# Patient Record
Sex: FEMALE | Race: OTHER | Employment: UNEMPLOYED | URBAN - METROPOLITAN AREA
[De-identification: names, ages, dates, MRNs, and addresses within clinical notes are randomized per-mention and may not be internally consistent; named-entity substitution may affect disease eponyms.]

---

## 2018-06-19 ENCOUNTER — ANESTHESIA EVENT (OUTPATIENT)
Dept: SURGERY | Age: 48
End: 2018-06-19
Payer: SELF-PAY

## 2018-06-19 NOTE — H&P
Outpatient Surgery History and Physical      Lala Ahmadi was seen and examined. Chief Complaint:   LEFT WRIST PAIN     Physical Exam:   There were no vitals taken for this visit. Heart:   Regular rhythm      Lungs:  Are clear      History:  No past medical history on file. No past surgical history on file. No family history on file. Social History     Occupational History    Not on file. Social History Main Topics    Smoking status: Not on file    Smokeless tobacco: Not on file    Alcohol use Not on file    Drug use: Not on file    Sexual activity: Not on file       Allergies: Reviewed per EMR  Allergies not on file    Medications:    Prior to Admission medications    Not on File        The surgery is planned for OPEN REDUCTION INTERNAL FIXATION OF LEFT DISTAL RADIUS          The patient is here today for outpatient surgery. I have examined the patient, no changes are noted in the patient's medical status. Necessity for the procedure/care is still present and the history and physical above is current.       Signed By: Patt Vaughan NP     June 19, 2018 3:30 PM

## 2018-06-20 ENCOUNTER — APPOINTMENT (OUTPATIENT)
Dept: GENERAL RADIOLOGY | Age: 48
End: 2018-06-20
Attending: ORTHOPAEDIC SURGERY
Payer: SELF-PAY

## 2018-06-20 ENCOUNTER — HOSPITAL ENCOUNTER (OUTPATIENT)
Age: 48
Setting detail: OUTPATIENT SURGERY
Discharge: HOME | End: 2018-06-20
Attending: ORTHOPAEDIC SURGERY | Admitting: ORTHOPAEDIC SURGERY
Payer: SELF-PAY

## 2018-06-20 ENCOUNTER — ANESTHESIA (OUTPATIENT)
Dept: SURGERY | Age: 48
End: 2018-06-20
Payer: SELF-PAY

## 2018-06-20 VITALS
HEART RATE: 67 BPM | WEIGHT: 179 LBS | DIASTOLIC BLOOD PRESSURE: 69 MMHG | SYSTOLIC BLOOD PRESSURE: 148 MMHG | RESPIRATION RATE: 16 BRPM | OXYGEN SATURATION: 95 % | TEMPERATURE: 98.5 F

## 2018-06-20 DIAGNOSIS — S62.102A CLOSED FRACTURE OF LEFT WRIST, INITIAL ENCOUNTER: Primary | ICD-10-CM

## 2018-06-20 LAB
HCG UR QL: NEGATIVE
HCG UR QL: NEGATIVE

## 2018-06-20 PROCEDURE — 74011250636 HC RX REV CODE- 250/636: Performed by: ANESTHESIOLOGY

## 2018-06-20 PROCEDURE — 77030000031 HC BIT DRL QC SYNT -C: Performed by: ORTHOPAEDIC SURGERY

## 2018-06-20 PROCEDURE — C1713 ANCHOR/SCREW BN/BN,TIS/BN: HCPCS | Performed by: ORTHOPAEDIC SURGERY

## 2018-06-20 PROCEDURE — 76210000063 HC OR PH I REC FIRST 0.5 HR: Performed by: ORTHOPAEDIC SURGERY

## 2018-06-20 PROCEDURE — 77030002933 HC SUT MCRYL J&J -A: Performed by: ORTHOPAEDIC SURGERY

## 2018-06-20 PROCEDURE — 77030018836 HC SOL IRR NACL ICUM -A: Performed by: ORTHOPAEDIC SURGERY

## 2018-06-20 PROCEDURE — 74011250637 HC RX REV CODE- 250/637: Performed by: ANESTHESIOLOGY

## 2018-06-20 PROCEDURE — 77030011884 HC TAPE CST PLSTR BSNM -A: Performed by: ORTHOPAEDIC SURGERY

## 2018-06-20 PROCEDURE — 77030011640 HC PAD GRND REM COVD -A: Performed by: ORTHOPAEDIC SURGERY

## 2018-06-20 PROCEDURE — 77030002916 HC SUT ETHLN J&J -A: Performed by: ORTHOPAEDIC SURGERY

## 2018-06-20 PROCEDURE — 74011000250 HC RX REV CODE- 250

## 2018-06-20 PROCEDURE — 76010010054 HC POST OP PAIN BLOCK: Performed by: ORTHOPAEDIC SURGERY

## 2018-06-20 PROCEDURE — 77030020782 HC GWN BAIR PAWS FLX 3M -B: Performed by: NURSE ANESTHETIST, CERTIFIED REGISTERED

## 2018-06-20 PROCEDURE — 74011250636 HC RX REV CODE- 250/636

## 2018-06-20 PROCEDURE — 77030003602 HC NDL NRV BLK BBMI -B: Performed by: NURSE ANESTHETIST, CERTIFIED REGISTERED

## 2018-06-20 PROCEDURE — 74011250636 HC RX REV CODE- 250/636: Performed by: NURSE PRACTITIONER

## 2018-06-20 PROCEDURE — 81025 URINE PREGNANCY TEST: CPT

## 2018-06-20 PROCEDURE — 76010000161 HC OR TIME 1 TO 1.5 HR INTENSV-TIER 1: Performed by: ORTHOPAEDIC SURGERY

## 2018-06-20 PROCEDURE — 76060000033 HC ANESTHESIA 1 TO 1.5 HR: Performed by: ORTHOPAEDIC SURGERY

## 2018-06-20 PROCEDURE — 76210000020 HC REC RM PH II FIRST 0.5 HR: Performed by: ORTHOPAEDIC SURGERY

## 2018-06-20 PROCEDURE — 77030016570 HC BLNKT BAIR HGGR 3M -B: Performed by: NURSE ANESTHETIST, CERTIFIED REGISTERED

## 2018-06-20 PROCEDURE — 76942 ECHO GUIDE FOR BIOPSY: CPT | Performed by: ORTHOPAEDIC SURGERY

## 2018-06-20 PROCEDURE — 77030000032 HC CUF TRNQT ZIMM -B: Performed by: ORTHOPAEDIC SURGERY

## 2018-06-20 PROCEDURE — 73100 X-RAY EXAM OF WRIST: CPT

## 2018-06-20 DEVICE — PLATE BNE LCK 2.4X54 MM LT VOLAR CLMN DSTL 10 HOLE COMPR SS: Type: IMPLANTABLE DEVICE | Site: RADIUS | Status: FUNCTIONAL

## 2018-06-20 DEVICE — SCREW BNE L12MM DIA2.4MM S STL ST LOK FULL THRD T8 STARDRV: Type: IMPLANTABLE DEVICE | Site: RADIUS | Status: FUNCTIONAL

## 2018-06-20 DEVICE — SCREW BNE L20MM DIA2.4MM S STL ST LOK FULL THRD T8 STARDRV: Type: IMPLANTABLE DEVICE | Site: RADIUS | Status: FUNCTIONAL

## 2018-06-20 DEVICE — SCREW BNE L16MM DIA2.4MM S STL ST LOK FULL THRD T8 STARDRV: Type: IMPLANTABLE DEVICE | Site: RADIUS | Status: FUNCTIONAL

## 2018-06-20 RX ORDER — ONDANSETRON 2 MG/ML
4 INJECTION INTRAMUSCULAR; INTRAVENOUS
Status: DISCONTINUED | OUTPATIENT
Start: 2018-06-20 | End: 2018-06-20 | Stop reason: HOSPADM

## 2018-06-20 RX ORDER — LIDOCAINE HYDROCHLORIDE 10 MG/ML
0.1 INJECTION INFILTRATION; PERINEURAL AS NEEDED
Status: DISCONTINUED | OUTPATIENT
Start: 2018-06-20 | End: 2018-06-20 | Stop reason: HOSPADM

## 2018-06-20 RX ORDER — SODIUM CHLORIDE, SODIUM LACTATE, POTASSIUM CHLORIDE, CALCIUM CHLORIDE 600; 310; 30; 20 MG/100ML; MG/100ML; MG/100ML; MG/100ML
75 INJECTION, SOLUTION INTRAVENOUS
Status: COMPLETED | OUTPATIENT
Start: 2018-06-20 | End: 2018-06-20

## 2018-06-20 RX ORDER — SODIUM CHLORIDE 0.9 % (FLUSH) 0.9 %
5-10 SYRINGE (ML) INJECTION AS NEEDED
Status: DISCONTINUED | OUTPATIENT
Start: 2018-06-20 | End: 2018-06-20 | Stop reason: HOSPADM

## 2018-06-20 RX ORDER — ALBUTEROL SULFATE 0.83 MG/ML
2.5 SOLUTION RESPIRATORY (INHALATION) AS NEEDED
Status: DISCONTINUED | OUTPATIENT
Start: 2018-06-20 | End: 2018-06-20 | Stop reason: HOSPADM

## 2018-06-20 RX ORDER — ACETAMINOPHEN 500 MG
1000 TABLET ORAL ONCE
Status: COMPLETED | OUTPATIENT
Start: 2018-06-20 | End: 2018-06-20

## 2018-06-20 RX ORDER — LIDOCAINE HYDROCHLORIDE 20 MG/ML
INJECTION, SOLUTION EPIDURAL; INFILTRATION; INTRACAUDAL; PERINEURAL AS NEEDED
Status: DISCONTINUED | OUTPATIENT
Start: 2018-06-20 | End: 2018-06-20 | Stop reason: HOSPADM

## 2018-06-20 RX ORDER — PROPOFOL 10 MG/ML
INJECTION, EMULSION INTRAVENOUS AS NEEDED
Status: DISCONTINUED | OUTPATIENT
Start: 2018-06-20 | End: 2018-06-20 | Stop reason: HOSPADM

## 2018-06-20 RX ORDER — SODIUM CHLORIDE, SODIUM LACTATE, POTASSIUM CHLORIDE, CALCIUM CHLORIDE 600; 310; 30; 20 MG/100ML; MG/100ML; MG/100ML; MG/100ML
1000 INJECTION, SOLUTION INTRAVENOUS CONTINUOUS
Status: DISCONTINUED | OUTPATIENT
Start: 2018-06-20 | End: 2018-06-20 | Stop reason: HOSPADM

## 2018-06-20 RX ORDER — CEFAZOLIN SODIUM/WATER 2 G/20 ML
2 SYRINGE (ML) INTRAVENOUS
Status: COMPLETED | OUTPATIENT
Start: 2018-06-20 | End: 2018-06-20

## 2018-06-20 RX ORDER — ROPIVACAINE HYDROCHLORIDE 5 MG/ML
INJECTION, SOLUTION EPIDURAL; INFILTRATION; PERINEURAL AS NEEDED
Status: DISCONTINUED | OUTPATIENT
Start: 2018-06-20 | End: 2018-06-20 | Stop reason: HOSPADM

## 2018-06-20 RX ORDER — HYDROMORPHONE HYDROCHLORIDE 2 MG/ML
0.5 INJECTION, SOLUTION INTRAMUSCULAR; INTRAVENOUS; SUBCUTANEOUS
Status: DISCONTINUED | OUTPATIENT
Start: 2018-06-20 | End: 2018-06-20 | Stop reason: HOSPADM

## 2018-06-20 RX ORDER — SODIUM CHLORIDE 0.9 % (FLUSH) 0.9 %
5-10 SYRINGE (ML) INJECTION EVERY 8 HOURS
Status: DISCONTINUED | OUTPATIENT
Start: 2018-06-20 | End: 2018-06-20 | Stop reason: HOSPADM

## 2018-06-20 RX ORDER — PROPOFOL 10 MG/ML
INJECTION, EMULSION INTRAVENOUS
Status: DISCONTINUED | OUTPATIENT
Start: 2018-06-20 | End: 2018-06-20 | Stop reason: HOSPADM

## 2018-06-20 RX ORDER — MIDAZOLAM HYDROCHLORIDE 1 MG/ML
2 INJECTION, SOLUTION INTRAMUSCULAR; INTRAVENOUS
Status: COMPLETED | OUTPATIENT
Start: 2018-06-20 | End: 2018-06-20

## 2018-06-20 RX ADMIN — PROPOFOL 50 MG: 10 INJECTION, EMULSION INTRAVENOUS at 13:08

## 2018-06-20 RX ADMIN — SODIUM CHLORIDE, SODIUM LACTATE, POTASSIUM CHLORIDE, AND CALCIUM CHLORIDE: 600; 310; 30; 20 INJECTION, SOLUTION INTRAVENOUS at 12:59

## 2018-06-20 RX ADMIN — ACETAMINOPHEN 1000 MG: 500 TABLET, FILM COATED ORAL at 10:37

## 2018-06-20 RX ADMIN — LIDOCAINE HYDROCHLORIDE 40 MG: 20 INJECTION, SOLUTION EPIDURAL; INFILTRATION; INTRACAUDAL; PERINEURAL at 13:08

## 2018-06-20 RX ADMIN — Medication 2 G: at 13:08

## 2018-06-20 RX ADMIN — ROPIVACAINE HYDROCHLORIDE 30 ML: 5 INJECTION, SOLUTION EPIDURAL; INFILTRATION; PERINEURAL at 12:07

## 2018-06-20 RX ADMIN — MIDAZOLAM HYDROCHLORIDE 2 MG: 1 INJECTION, SOLUTION INTRAMUSCULAR; INTRAVENOUS at 12:04

## 2018-06-20 RX ADMIN — SODIUM CHLORIDE, SODIUM LACTATE, POTASSIUM CHLORIDE, AND CALCIUM CHLORIDE 75 ML/HR: 600; 310; 30; 20 INJECTION, SOLUTION INTRAVENOUS at 10:38

## 2018-06-20 RX ADMIN — PROPOFOL 100 MCG/KG/MIN: 10 INJECTION, EMULSION INTRAVENOUS at 13:08

## 2018-06-20 NOTE — PROGRESS NOTES
present at bedside during anesthesia peripheral block with Dr. Karli Villalobos and unit nurses. Ghassan German Minh  [email or web address]  c: [phone] / Antelmo Story [address] / [place], [facility]  [email or web address]. [facility]

## 2018-06-20 NOTE — PROGRESS NOTES
present at bedside in pre-op during assessment with unit nurse and signature of consents. Vanessa Gooden Xin Bowen  Patient [email or web address]  c: [phone] / Antelmo Alvarado / [facility], [address] [place] [address]  [email or web address]. [email or web address]

## 2018-06-20 NOTE — ANESTHESIA PREPROCEDURE EVALUATION
Anesthetic History   No history of anesthetic complications            Review of Systems / Medical History  Patient summary reviewed and pertinent labs reviewed    Pulmonary  Within defined limits                 Neuro/Psych   Within defined limits           Cardiovascular                Pertinent negatives: No angina  Exercise tolerance: >4 METS     GI/Hepatic/Renal  Within defined limits              Endo/Other        Obesity     Other Findings   Comments: Interview through [address]  . Physical Exam    Airway  Mallampati: II  TM Distance: 4 - 6 cm  Neck ROM: normal range of motion   Mouth opening: Normal     Cardiovascular    Rhythm: regular           Dental         Pulmonary                 Abdominal         Other Findings            Anesthetic Plan    ASA: 2  Anesthesia type: total IV anesthesia      Post-op pain plan if not by surgeon: peripheral nerve block single    Induction: Intravenous  Anesthetic plan and risks discussed with: Patient      supraclav.

## 2018-06-20 NOTE — ANESTHESIA POSTPROCEDURE EVALUATION
Post-Anesthesia Evaluation and Assessment    Patient: Antione Mendez MRN: [id number]  SSN: [id number]    YOB: 1970  Age: 57 y.o. Sex: female       Cardiovascular Function/Vital Signs  Visit Vitals    /69    Pulse 70    Temp 36.9 °C (98.5 °F)    Resp 16    Wt 81.2 kg (179 lb)    SpO2 95%       Patient is status post total IV anesthesia anesthesia for Procedure(s):  LEFT 3 PARTm DISTAL RADIUS OPEN REDUCTION INTERNAL FIXATION/ CHOICE . Nausea/Vomiting: None    Postoperative hydration reviewed and adequate. Pain:  Pain Scale 1: Numeric (0 - 10) (06/20/18 1412)  Pain Intensity 1: 0 (06/20/18 1412)   Managed    Neurological Status:   Neuro (WDL): Exceptions to WDL (06/20/18 1412)  Neuro  Neurologic State: Drowsy;Sleeping (06/20/18 1412)   At baseline    Mental Status and Level of Consciousness: Arousable    Pulmonary Status:   O2 Device: Room air (06/20/18 1424)   Adequate oxygenation and airway patent    Complications related to anesthesia: None    Post-anesthesia assessment completed.  No concerns    Signed By: Darion Orozco MD     June 20, 2018

## 2018-06-20 NOTE — BRIEF OP NOTE
BRIEF OPERATIVE NOTE    Date of Procedure: 6/20/2018   Preoperative Diagnosis: Closed die punch fracture of distal end of left radius, initial encounter [N68.956A]  Postoperative Diagnosis: Left 3 part distal radius fracture    Procedure(s):  LEFT 3 PART DISTAL RADIUS OPEN REDUCTION INTERNAL FIXATION  Surgeon(s) and Role:     * Daniel Horton MD - Primary         Surgical Assistant: NONE    Surgical Staff:  Circ-1: Laura Juan RN  Circ-Relief: Tavia Payton RN  Scrub Tech-1: Jackie Mars  Scrub Tech-2: Brian Blevins  Scrub Tech-3: Mahnaz Tam  Event Time In   Incision Start 1321   Incision Close 1410     Anesthesia: Regional   Estimated Blood Loss: TOURNIQUET  Specimens: * No specimens in log *   Findings: NONE   Complications: NONE  Implants: * No implants in log *

## 2018-06-20 NOTE — ANESTHESIA PROCEDURE NOTES
Peripheral Block    Start time: 6/20/2018 12:00 PM  End time: 6/20/2018 12:07 PM  Performed by: Karla Fernández  Authorized by: Karla Fernández       Pre-procedure:    Indications: at surgeon's request and post-op pain management    Preanesthetic Checklist: patient identified, risks and benefits discussed, site marked, timeout performed, anesthesia consent given and patient being monitored    Timeout Time: 12:00          Block Type:   Block Type:  Supraclavicular  Laterality:  Left  Monitoring:  Continuous pulse ox, frequent vital sign checks, heart rate, oxygen and responsive to questions  Injection Technique:  Single shot  Procedures: ultrasound guided    Patient Position: supine (head elevated 45 degrees)  Prep: chlorhexidine    Location:  Supraclavicular  Needle Type:  Stimuplex  Needle Gauge:  20 G  Needle Localization:  Ultrasound guidance  Medication Injected:  0.5%  ropivacaine  Volume (mL):  30    Assessment:  Number of attempts:  1  Injection Assessment:  Incremental injection every 5 mL, local visualized surrounding nerve on ultrasound, negative aspiration for blood, no intravascular symptoms, no paresthesia and ultrasound image on chart  Patient tolerance:  Patient tolerated the procedure well with no immediate complications

## 2018-06-20 NOTE — IP AVS SNAPSHOT
[address] [place] [id number] 
[phone] Patient: Uyen Brewster MRN: [id number] PNY:7[birth year]02[birth year] About your hospitalization You were admitted on:  June 20, 2018 You last received care in the:  [facility] PACU You were discharged on:  June 20, 2018 Why you were hospitalized Your primary diagnosis was:  Not on File Follow-up Information Follow up With Details Comments Contact Info Ruthann Kim MD   [facility] 10 200 [address] 16 Discharge Orders Procedure Order Date Status Priority Quantity Spec Type Associated Dx CALL YOUR DOCTOR For: Severe uncontrolled pain. , Redness, tenderness, or signs of infection. 06/20/18 1208 Normal Routine 1  Closed fracture of left wrist, initial encounter [[id number]] Questions: For:  Severe uncontrolled pain. For:  Redness, tenderness, or signs of infection. ACTIVITY AFTER DISCHARGE Patient should: Restrict weight bearing, Restrict lifting 06/20/18 1208 Normal Routine 1  Closed fracture of left wrist, initial encounter [[id number]] Questions: Patient should:  Restrict weight bearing Patient should:  Restrict lifting DIET REGULAR No added salt 06/20/18 1208 Normal Routine 1  Closed fracture of left wrist, initial encounter [[id number]] Questions: Additional options:  No added salt DRESSING, DO NOT REMOVE 06/20/18 1208 Normal Routine 1  Closed fracture of left wrist, initial encounter [[id number]] Comments:  Keep clean, dry and intact until next clinic visit. A check dieter indicates which time of day the medication should be taken. My Medications CONTINUE taking these medications Instructions Each Dose to Equal  
 Morning Noon Evening Bedtime OTHER Your last dose was: Your next dose is:    
   
   
 nightly. Birth control pills STOP taking these medications   
 oxyCODONE-acetaminophen 5-325 mg per tablet Commonly known as:  PERCOCET Discharge Instructions FOLLOW-UP APPT WITH DR Triny Campos - 6/28/18 @ 10:00 AM 
 
NON-WEIGHT BEARING LEFT ARM 
NO LIFTING WITH LEFT ARM 
ELEVATE LEFT ARM 
LEFT ARM SLING 
MOVE FINGERS FREELY 
KEEP DRESSING CLEAN AND DRY 
 
PRESCRIPTION FOR OXYCODONE 5 MG GIVEN TO PATIENT AT OFFICE YESTERDAY After general anesthesia or intravenous sedation, for 24 hours or while taking prescription Narcotics: · Limit your activities · Do not drive and operate hazardous machinery · Do not make important personal or business decisions · Do  not drink alcoholic beverages · If you have not urinated within 8 hours after discharge, please contact your surgeon on call. *  Please give a list of your current medications to your Primary Care Provider. *  Please update this list whenever your medications are discontinued, doses are 
    changed, or new medications (including over-the-counter products) are added. *  Please carry medication information at all times in case of emergency situations. These are general instructions for a healthy lifestyle: No smoking/ No tobacco products/ Avoid exposure to second hand smoke Surgeon General's Warning:  Quitting smoking now greatly reduces serious risk to your health. Obesity, smoking, and sedentary lifestyle greatly increases your risk for illness A healthy diet, regular physical exercise & weight monitoring are important for maintaining a healthy lifestyle You may be retaining fluid if you have a history of heart failure or if you experience any of the following symptoms:  Weight gain of 3 pounds or more overnight or 5 pounds in a week, increased swelling in our hands or feet or shortness of breath while lying flat in bed.   Please call your doctor as soon as you notice any of these symptoms; do not wait until your next office visit. Recognize signs and symptoms of STROKE: 
F-face looks uneven A-arms unable to move or move unevenly S-speech slurred or non-existent T-time-call 911 as soon as signs and symptoms begin-DO NOT go Back to bed or wait to see if you get better-TIME IS BRAIN. Introducing [facility] & HEALTH SERVICES! [facility] introduce portal paciente MyChart . Ahora se puede acceder a partes de dubose expediente médico, enviar por correo electrónico la oficina de dubose médico y solicitar renovaciones de medicamentos en línea. En duboes navegador de Internet , Dyan Choudhary a [email or web address] Stephane clic en el usuario por Kassandra Butter? Gale Fitzpatrick clic aquí en la sesión [facility]. Verá la página de registro [place]. Ingrese dubose código de Bank  Rosa dez y dio aparece a continuación. Usted no tendrá que [employer] código después de mary completado el proceso de registro . Si usted no se inscribe antes de la fecha de caducidad , debe solicitar un nuevo código. · MyChart Código de acceso : [id number] Expires: 9/17/2018  3:00 PM 
 
Ingresa los últimos cuatro dígitos de dubose Número de Seguro Social ( xxxx ) y fecha de nacimiento ( dd / mm / aaaa ) dio se indica y stephane clic en Enviar. Usted será llevado a la siguiente página de registro . Crear un ID MyChart . Esta será dubose ID de inicio de sesión de MyChart y no puede ser [place] , por lo que pensar en rock que es Radha Laura y fácil de recordar . Crear rock contraseña MyChart . Usted puede cambiar dubose contraseña en cualquier momento . Ingrese dubose Password Reset de preguntas y [place] . [place] se puede utilizar en un momento posterior si usted olvida dubose contraseña. Introduzca dubose dirección de correo electrónico . Mariangel Zhu recibirá rock notificación por correo electrónico cuando la nueva información está disponible en MyChart . Cheron Baljit tomlin en Registrarse.  Jerri Milling libertad y descargar porciones de dubose expediente médico. 
 Randy nabor en el enlace de descarga del menú Resumen para descargar rock copia portátil de dubose información médica . Si tiene [employer] , por favor visite la sección de preguntas frecuentes del sitio web MyChart . Recuerde, MyChart NO es que se utilizará para las necesidades urgentes. Para emergencias médicas , llame al 911 . Ahora disponible en dubose iPhone y Android ! Introducing Juan Rodriguez As a [facility] patient, I wanted to make you aware of our electronic visit tool called Juan Rodriguez. MoranPotential 24/CoinSeed allows you to connect within minutes with a medical provider 24 hours a day, seven days a week via a mobile device or tablet or logging into a secure website from your computer. You can access Juan Rodriguez from anywhere in the [place]. A virtual visit might be right for you when you have a simple condition and feel like you just dont want to get out of bed, or cant get away from work for an appointment, when your regular [facility] provider is not available (evenings, weekends or holidays), or when youre out of town and need minor care. Electronic visits cost only $49 and if the MoranDoNation/CoinSeed provider determines a prescription is needed to treat your condition, one can be electronically transmitted to a nearby pharmacy*. Please take a moment to enroll today if you have not already done so. The enrollment process is free and takes just a few minutes. To enroll, please download the [employer]/CoinSeed anthony to your tablet or phone, or visit [email or web address] to enroll on your computer. And, as an [address] [place] [address] patient with a [employer] account, the results of your visits will be scanned into your electronic medical record and your primary care provider will be able to view the scanned results.    
We urge you to continue to see your regular [facility] provider for your ongoing medical care. And while your primary care provider may not be the one available when you seek a Juan Christiecollin virtual visit, the peace of mind you get from getting a real diagnosis real time can be priceless. For more information on Juan Christieiraisfin, view our Frequently Asked Questions (FAQs) at [email or web address]. Sincerely, 
 
Jarrett Castaneda MD 
Chief Medical Officer [facility] *:  certain medications cannot be prescribed via Juan Christiecollin Unresulted tests-please follow up with your PCP on these results Procedure/Test Authorizing Provider [place] XR TECHNOLOGIST Suraj Albrecht MD  
 XR WRIST LT AP/LAT Svitlana Baker MD  
  
Providers Seen During Your Hospitalization Provider Specialty Primary office phone Svitlana Baker MD Orthopedic Surgery [phone] Your Primary Care Physician (PCP) Primary Care Physician Office Phone Office Fax UNKNOWN, PROVIDER ** None ** ** None ** You are allergic to the following No active allergies Recent Documentation Weight OB Status Smoking Status 81.2 kg Having regular periods Never Smoker Emergency Contacts Name Discharge Info Relation Home Work Mobile Joanen Odell [5] [phone] Patient Belongings The following personal items are in your possession at time of discharge: 
  Dental Appliances: None Please provide this summary of care documentation to your next provider. Signatures-by signing, you are acknowledging that this After Visit Summary has been reviewed with you and you have received a copy. Patient Signature:  ____________________________________________________________ Date:  ____________________________________________________________  
  
Eileen Kevin Provider Signature:  ____________________________________________________________ Date:  ____________________________________________________________  
  
  
   
  
[address] 
[address] [address] 
[phone] Patient: Silvina Kelly MRN: [id number] XCT:2[birth year]89 Sobre dubose hospitalización Le admitieron el:  June 20, 2018 Dubose tratamiento más reciente [facility] [place]:  [facility] PACU Le dieron de meera el:  June 20, 2018 Por qué le ingresaron Dubose diagnosis primaria es:  No está archivado/a Follow-up Information Follow up With Details Comments Contact Info Nancy Price MD   [facility] 10 200 [employer] [address] [facility] [address] Discharge Orders Procedure Order Date Status Priority Quantity Spec Type Associated Dx CALL YOUR DOCTOR For: Severe uncontrolled pain. , Redness, tenderness, or signs of infection. 06/20/18 1208 Normal Routine 1  Closed fracture of left wrist, initial encounter [[id number]] Questions: For:  Severe uncontrolled pain. For:  Redness, tenderness, or signs of infection. ACTIVITY AFTER DISCHARGE Patient should: Restrict weight bearing, Restrict lifting 06/20/18 1208 Normal Routine 1  Closed fracture of left wrist, initial encounter [[id number]] Questions: Patient should:  Restrict weight bearing Patient should:  Restrict lifting DIET REGULAR No added salt 06/20/18 1208 Normal Routine 1  Closed fracture of left wrist, initial encounter [[id number]] Questions: Additional options:  No added salt DRESSING, DO NOT REMOVE 06/20/18 1208 Normal Routine 1  Closed fracture of left wrist, initial encounter [[id number]] Comments:  Keep clean, dry and intact until next clinic visit. A check dieter indicates which time of day the medication should be taken. My Medications SIGA tomando estos medicamentos Instructions Each Dose to Equal  
 Morning Noon Evening Bedtime  OTHER  
   
 Your last dose was: Your next dose is:    
   
   
 nightly. Birth control pills DEJE de anna estos medicamentos   
 oxyCODONE-acetaminophen 5-325 mg per tablet También conocido dio:  PERCOCET Instrucciones a mae de meera FOLLOW-UP APPT WITH DR Stevenson Irving - 6/28/18 @ 10:00 AM 
 
NON-WEIGHT BEARING LEFT ARM 
NO LIFTING WITH LEFT ARM 
ELEVATE LEFT ARM 
LEFT ARM SLING 
MOVE FINGERS FREELY 
KEEP DRESSING CLEAN AND DRY 
 
PRESCRIPTION FOR OXYCODONE 5 MG GIVEN TO PATIENT AT OFFICE YESTERDAY After general anesthesia or intravenous sedation, for 24 hours or while taking prescription Narcotics: · Limit your activities · Do not drive and operate hazardous machinery · Do not make important personal or business decisions · Do  not drink alcoholic beverages · If you have not urinated within 8 hours after discharge, please contact your surgeon on call. *  Please give a list of your current medications to your Primary Care Provider. *  Please update this list whenever your medications are discontinued, doses are 
    changed, or new medications (including over-the-counter products) are added. *  Please carry medication information at all times in case of emergency situations. These are general instructions for a healthy lifestyle: No smoking/ No tobacco products/ Avoid exposure to second hand smoke Surgeon General's Warning:  Quitting smoking now greatly reduces serious risk to your health. Obesity, smoking, and sedentary lifestyle greatly increases your risk for illness A healthy diet, regular physical exercise & weight monitoring are important for maintaining a healthy lifestyle You may be retaining fluid if you have a history of heart failure or if you experience any of the following symptoms:  Weight gain of 3 pounds or more overnight or 5 pounds in a week, increased swelling in our hands or feet or shortness of breath while lying flat in bed. Please call your doctor as soon as you notice any of these symptoms; do not wait until your next office visit. Recognize signs and symptoms of STROKE: 
F-face looks uneven A-arms unable to move or move unevenly S-speech slurred or non-existent T-time-call 911 as soon as signs and symptoms begin-DO NOT go Back to bed or wait to see if you get better-TIME IS BRAIN. Introducing [facility] & HEALTH SERVICES! [facility] introduce portal paciente MyChart . Ahora se puede acceder a partes de dubose expediente médico, enviar por correo electrónico la oficina de dubose médico y solicitar renovaciones de medicamentos en línea. En dubose navegador de Internet , Sue Torres a [email or web address] Stephane clic en el usuario por Massimo Peacock? Lacinda Comes clic aquí en la sesión [facility]. Verá la página de registro [place]. Ingrese dubose código de Bank of Rosa dez y dio aparece a continuación. Usted no tendrá que [employer] código después de mary completado el proceso de registro . Si usted no se inscribe antes de la fecha de caducidad , debe solicitar un nuevo código. · MyChart Código de acceso : [id number] Expires: 9/17/2018  3:00 PM 
 
Ingresa los últimos cuatro dígitos de dubose Número de Seguro Social ( xxxx ) y fecha de nacimiento ( dd / mm / aaaa ) dio se indica y stephane clic en Enviar. ted será llevado a la siguiente página de registro . Crear un ID MyChart . Esta será dubose ID de inicio de sesión de MyChart y no puede ser [place] , por lo que pensar en rock que es Miriam Stevens y fácil de recordar . Crear rock contraseña MyChart . ted puede cambiar dubose contraseña en cualquier momento . Ingrese dubose Password Reset de preguntas y [place] . [place] se puede utilizar en un momento posterior si usted olvida dubose contraseña.  
Introduzca dubose dirección de correo electrónico . Nara Prime recibirá rock notificación por correo electrónico cuando la nueva información está disponible en MyChart . Radha tomlin en Registrarse. Lorren Houston libertad y descargar porciones de dubose expediente médico. 
Randy nabor en el enlace de descarga del menú Resumen para descargar rock copia portátil de dubose información médica . Si tiene [employer] , por favor visite la sección de preguntas frecuentes del sitio web MyChart . Recuerde, MyChart NO es que se utilizará para las necesidades urgentes. Para emergencias médicas , llame al 911 . Ahora disponible en dubose iPhone y Android ! Introducing Juan Rodriguez As a [facility] patient, I wanted to make you aware of our electronic visit tool called Juan Rodriguez. Intimate Bridge 2 Conception 24/7 allows you to connect within minutes with a medical provider 24 hours a day, seven days a week via a mobile device or tablet or logging into a secure website from your computer. You can access Juan Rodriguez from anywhere in the [place]. A virtual visit might be right for you when you have a simple condition and feel like you just dont want to get out of bed, or cant get away from work for an appointment, when your regular [facility] provider is not available (evenings, weekends or holidays), or when youre out of town and need minor care. Electronic visits cost only $49 and if the Intimate Bridge 2 Conception 24/7 provider determines a prescription is needed to treat your condition, one can be electronically transmitted to a nearby pharmacy*. Please take a moment to enroll today if you have not already done so. The enrollment process is free and takes just a few minutes. To enroll, please download the Intimate Bridge 2 Conception 24/Siftit anthony to your tablet or phone, or visit [email or web address] to enroll on your computer. And, as an [address] [place] [address] patient with a [employer] account, the results of your visits will be scanned into your electronic medical record and your primary care provider will be able to view the scanned results. We urge you to continue to see your regular [facility] provider for your ongoing medical care. And while your primary care provider may not be the one available when you seek a Juan Rodriguez virtual visit, the peace of mind you get from getting a real diagnosis real time can be priceless. For more information on Juan Rodriguez, view our Frequently Asked Questions (FAQs) at [email or web address]. Sincerely, 
 
Nadira Collazo MD 
Chief Medical Officer [facility] *:  certain medications cannot be prescribed via Juan Christieiraisleydi Unresulted tests-please follow up with your PCP on these results Procedimiento/Prueba Autorizada por [place] XR TECHNOLOGIST Tez Garrido MD  
 XR WRIST LT AP/LAT Omar Patel MD  
  
Providers Seen During Your Hospitalization Personal Médico Especialidad Teléfono principal de la ofvanessaa Omar Patel MD Orthopedic Surgery [phone] [place] médico de atención primaria (PCP ) Primary Care Physician Office Phone Office Fax UNKNOWN, PROVIDER ** None ** ** None ** Tiene alergias a lo siguiente No tiene alergias Documentación recientes Weight Estado obstétrico Estatus de tabaquísmo 81.2 kg Having regular periods Never Smoker Emergency Contacts Name Discharge Info Relation Home Work Mobile Rafa Muller [5] [phone] Patient Belongings The following personal items are in your possession at time of discharge: 
  Dental Appliances: None Please provide this summary of care documentation to your next provider. Signatures-by signing, you are acknowledging that this After Visit Summary has been reviewed with you and you have received a copy. Patient Signature:  ____________________________________________________________ Date:  ____________________________________________________________  
  
Arna Cody Provider Signature:  ____________________________________________________________ Date:  ____________________________________________________________

## 2018-06-20 NOTE — PERIOP NOTES
I have reviewed discharge instructions with the pt. Pt's son, and pt's daughter in law. Carlos Ross, , present. The pt, pt's son, and pt's daughter in-law all verbalized understanding.

## 2018-06-20 NOTE — DISCHARGE INSTRUCTIONS
FOLLOW-UP APPT WITH DR Adrienne Beltran - 6/28/18 @ 10:00 AM    NON-WEIGHT BEARING LEFT ARM  NO LIFTING WITH LEFT ARM  ELEVATE LEFT ARM  LEFT ARM SLING  MOVE FINGERS FREELY  KEEP DRESSING CLEAN AND DRY    PRESCRIPTION FOR OXYCODONE 5 MG GIVEN TO PATIENT AT OFFICE YESTERDAY    After general anesthesia or intravenous sedation, for 24 hours or while taking prescription Narcotics:  · Limit your activities  · Do not drive and operate hazardous machinery  · Do not make important personal or business decisions  · Do  not drink alcoholic beverages  · If you have not urinated within 8 hours after discharge, please contact your surgeon on call. *  Please give a list of your current medications to your Primary Care Provider. *  Please update this list whenever your medications are discontinued, doses are      changed, or new medications (including over-the-counter products) are added. *  Please carry medication information at all times in case of emergency situations. These are general instructions for a healthy lifestyle:  No smoking/ No tobacco products/ Avoid exposure to second hand smoke  Surgeon General's Warning:  Quitting smoking now greatly reduces serious risk to your health. Obesity, smoking, and sedentary lifestyle greatly increases your risk for illness  A healthy diet, regular physical exercise & weight monitoring are important for maintaining a healthy lifestyle    You may be retaining fluid if you have a history of heart failure or if you experience any of the following symptoms:  Weight gain of 3 pounds or more overnight or 5 pounds in a week, increased swelling in our hands or feet or shortness of breath while lying flat in bed. Please call your doctor as soon as you notice any of these symptoms; do not wait until your next office visit.     Recognize signs and symptoms of STROKE:  F-face looks uneven  A-arms unable to move or move unevenly  S-speech slurred or non-existent  T-time-call 911 as soon as signs and symptoms begin-DO NOT go       Back to bed or wait to see if you get better-TIME IS BRAIN.

## 2018-06-20 NOTE — PROGRESS NOTES
present during registration for procedure. Izabel Ruiz  Patient [email or web address]  c: [phone] / Antelmo Alvarado / [place], [facility]  [email or web address] - [email or web address]. [facility]

## 2018-06-20 NOTE — PROGRESS NOTES
present in recovery for discharge. Thank you for this referral,      Mardi Meckel, 20 [facility]  /Patient [address]  [address] [facility], [address] [place] [address]    [phone]

## 2018-06-25 NOTE — OP NOTES
[id number]  MR#: [id number]  : 1970  ACCOUNT #: [de-identified]   DATE OF SERVICE: 2018    PREOPERATIVE DIAGNOSIS:  Left 3-part intra-articular distal radius fracture. POSTOPERATIVE DIAGNOSIS:  Left 3-part intra-articular distal radius fracture. PROCEDURE PERFORMED:  ORIF of a left 3-part intra-articular distal radius fracture. SURGEON:  Patel Justice MD      ASSISTANT:  NONE     ANESTHESIA:  General with a block for postoperative pain relief. PROCEDURE:  The patient was brought to the operative suite, placed in supine position. After adequate anesthesia was achieved in the form of general with a block, tourniquet was applied to left arm with adequate padding and a soft roll. It was preset to a level of 250 mmHg. Left upper extremity was then prepped and draped in usual sterile fashion. It was elevated and exsanguinated by gravity. Tourniquet was inflated. Incision was made over the volar aspect of the wrist centered along the line of the flexor carpi radialis tendon. Hemostasis achieved with Bovie cautery. Fascia incised along the line of the skin incision. The pronator quadratus and flexor pollicis longus were taken down off the radius and reflected ulnarward. The fracture was a 3-part intra-articular fracture. It is reduced and provisionally fixed with K-wires. It is then secured with a volarly applied Synthes distal radial locking plate. Plate was pulled to the bone proximally using a 2.4 cortical screw. Its position was confirmed by fluoroscopy, and the anatomic reduction of the fracture is confirmed. The fracture was then secured with multiple locking screws distally and proximally with the original cortical screws removed. AP, lateral and oblique fluoroscopic images confirmed the fracture is anatomically reduced. Hardware is in good position.   The wound was then irrigated with normal saline and closed in layers. A sterile compressive dressing and sugar tong splint were applied. Tourniquet was deflated, and the patient was transferred to the recovery room, alert and oriented, under the care of anesthesia. ESTIMATED BLOOD LOSS:  Minimal.      FLUIDS:  See anesthesia record. CLOSURE:  Primary. COMPLICATIONS:  None. SPECIMENS REMOVED:  NONE     IMPLANTS:  SEE BRIEF OP NOTE      MD Tenzin Trivedi / Meka Benitez  D: 06/24/2018 19:17     T: 06/25/2018 06:13  JOB #: [id number]

## (undated) DEVICE — Device

## (undated) DEVICE — ZIMMER® STERILE DISPOSABLE TOURNIQUET CUFF WITH PROTECTIVE SLEEVE AND PLC, DUAL PORT, SINGLE BLADDER, 18 IN. (46 CM)

## (undated) DEVICE — 2000CC GUARDIAN II: Brand: GUARDIAN

## (undated) DEVICE — STERILE LATEX POWDER-FREE SURGICAL GLOVESWITH NITRILE COATING: Brand: PROTEXIS

## (undated) DEVICE — SOLUTION IV 1000ML 0.9% SOD CHL

## (undated) DEVICE — SUT ETHLN 4-0 18IN PS2 BLK --

## (undated) DEVICE — AMD ANTIMICROBIAL GAUZE SPONGES,12 PLY USP TYPE VII, 0.2% POLYHEXAMETHYLENE BIGUANIDE HCI (PHMB): Brand: CURITY

## (undated) DEVICE — PADDING CAST COHESIVE 4 YDX3 IN HND TEARABLE COTTON SPEC 100

## (undated) DEVICE — STERILE HOOK LOCK LATEX FREE ELASTIC BANDAGE 4INX5YD: Brand: HOOK LOCK™

## (undated) DEVICE — GLOVE SURG SZ 8.5 L11.6IN FNGR THK12.6MIL CUF THK8.3MIL BRN

## (undated) DEVICE — SUTURE MCRYL SZ 2-0 L27IN ABSRB VLT CT-2 L26MM 1/2 CIR Y333H

## (undated) DEVICE — (D)PREP SKN CHLRAPRP APPL 26ML -- CONVERT TO ITEM 371833

## (undated) DEVICE — INTENDED FOR TISSUE SEPARATION, AND OTHER PROCEDURES THAT REQUIRE A SHARP SURGICAL BLADE TO PUNCTURE OR CUT.: Brand: BARD-PARKER ® STAINLESS STEEL BLADES

## (undated) DEVICE — SCREW BNE L12MM DIA24MM CORT S STL ST T8 STARDRV RECESS
Type: IMPLANTABLE DEVICE | Site: RADIUS | Status: NON-FUNCTIONAL
Removed: 2018-06-20

## (undated) DEVICE — BUTTON SWITCH PENCIL BLADE ELECTRODE, HOLSTER: Brand: EDGE

## (undated) DEVICE — 1.8MM DRILL BIT WITH DEPTH MARK/QC/110MM

## (undated) DEVICE — X-LARGE COTTON GLOVE: Brand: DEROYAL

## (undated) DEVICE — TAPE CST FST SET 3INX3YD BLU --

## (undated) DEVICE — REM POLYHESIVE ADULT PATIENT RETURN ELECTRODE: Brand: VALLEYLAB